# Patient Record
(demographics unavailable — no encounter records)

---

## 2025-06-09 NOTE — HISTORY OF PRESENT ILLNESS
[de-identified] : VIJAYA METZGER  presents with a history of cerumen impaction. She said cerumen was last removed 12/2023. She has bilateral ear pressure. Denies otalgia, otorrhea, tinnitus, or dizziness.  No history of recurrent ear infections, noise exposure, or family history of hearing loss. No perceived changes in hearing.

## 2025-06-09 NOTE — PHYSICAL EXAM
[Normal] : mucosa is normal [Midline] : trachea located in midline position [[email or web address]] : Microscopic ear exam with cerumen debridement:  Right ear: Obstructing cerumen was debrided from the ear canal using suction, and curet. The ear canal was otherwise within normal limits. The tympanic membrane was intact and noninflamed.  Left ear: Obstructing cerumen was debrided from the ear canal using suction, and curets. The ear canal was otherwise within normal limits. The tympanic membrane was intact and noninflamed.